# Patient Record
Sex: MALE | Race: WHITE | NOT HISPANIC OR LATINO | URBAN - METROPOLITAN AREA
[De-identification: names, ages, dates, MRNs, and addresses within clinical notes are randomized per-mention and may not be internally consistent; named-entity substitution may affect disease eponyms.]

---

## 2023-05-06 DIAGNOSIS — E10.9 TYPE 1 DIABETES MELLITUS WITHOUT COMPLICATION (MULTI): Primary | ICD-10-CM

## 2023-05-08 RX ORDER — INSULIN GLARGINE 100 [IU]/ML
INJECTION, SOLUTION SUBCUTANEOUS
Qty: 20 ML | Refills: 3 | Status: SHIPPED | OUTPATIENT
Start: 2023-05-08 | End: 2023-12-18 | Stop reason: SDUPTHER

## 2023-06-01 ENCOUNTER — OFFICE VISIT (OUTPATIENT)
Dept: PRIMARY CARE | Facility: CLINIC | Age: 38
End: 2023-06-01
Payer: COMMERCIAL

## 2023-06-01 VITALS
TEMPERATURE: 98.1 F | HEART RATE: 87 BPM | WEIGHT: 165 LBS | DIASTOLIC BLOOD PRESSURE: 86 MMHG | BODY MASS INDEX: 23.01 KG/M2 | SYSTOLIC BLOOD PRESSURE: 126 MMHG | OXYGEN SATURATION: 96 %

## 2023-06-01 DIAGNOSIS — J32.9 SINUSITIS, UNSPECIFIED CHRONICITY, UNSPECIFIED LOCATION: Primary | ICD-10-CM

## 2023-06-01 DIAGNOSIS — E10.9 TYPE 1 DIABETES MELLITUS WITHOUT COMPLICATION (MULTI): ICD-10-CM

## 2023-06-01 DIAGNOSIS — T78.40XA ALLERGY, INITIAL ENCOUNTER: ICD-10-CM

## 2023-06-01 PROBLEM — K21.9 GERD WITHOUT ESOPHAGITIS: Status: ACTIVE | Noted: 2023-06-01

## 2023-06-01 PROBLEM — J30.1 ALLERGIC RHINITIS DUE TO POLLEN: Status: ACTIVE | Noted: 2023-06-01

## 2023-06-01 PROBLEM — M25.562 ACUTE PAIN OF BOTH KNEES: Status: ACTIVE | Noted: 2023-06-01

## 2023-06-01 PROBLEM — M25.561 ACUTE PAIN OF BOTH KNEES: Status: ACTIVE | Noted: 2023-06-01

## 2023-06-01 PROBLEM — J45.990 EXERCISE-INDUCED ASTHMA (HHS-HCC): Status: ACTIVE | Noted: 2023-06-01

## 2023-06-01 PROCEDURE — 3074F SYST BP LT 130 MM HG: CPT | Performed by: NURSE PRACTITIONER

## 2023-06-01 PROCEDURE — 3079F DIAST BP 80-89 MM HG: CPT | Performed by: NURSE PRACTITIONER

## 2023-06-01 PROCEDURE — 99214 OFFICE O/P EST MOD 30 MIN: CPT | Performed by: NURSE PRACTITIONER

## 2023-06-01 RX ORDER — FLUTICASONE PROPIONATE 50 MCG
2 SPRAY, SUSPENSION (ML) NASAL DAILY
Qty: 16 G | Refills: 11 | Status: SHIPPED | OUTPATIENT
Start: 2023-06-01 | End: 2023-06-15 | Stop reason: SDUPTHER

## 2023-06-01 RX ORDER — MOMETASONE FUROATE AND FORMOTEROL FUMARATE DIHYDRATE 200; 5 UG/1; UG/1
2 AEROSOL RESPIRATORY (INHALATION) 2 TIMES DAILY
COMMUNITY
Start: 2016-01-20 | End: 2023-08-18 | Stop reason: ALTCHOICE

## 2023-06-01 RX ORDER — INSULIN ASPART 100 [IU]/ML
INJECTION, SOLUTION INTRAVENOUS; SUBCUTANEOUS
COMMUNITY
Start: 2014-07-16 | End: 2023-08-02 | Stop reason: SDUPTHER

## 2023-06-01 RX ORDER — ALBUTEROL SULFATE 90 UG/1
AEROSOL, METERED RESPIRATORY (INHALATION)
COMMUNITY
Start: 2014-07-16

## 2023-06-01 RX ORDER — MONTELUKAST SODIUM 10 MG/1
10 TABLET ORAL NIGHTLY
Qty: 30 TABLET | Refills: 1 | Status: SHIPPED | OUTPATIENT
Start: 2023-06-01 | End: 2023-06-15 | Stop reason: SDUPTHER

## 2023-06-01 RX ORDER — FLUTICASONE PROPIONATE 50 MCG
2 SPRAY, SUSPENSION (ML) NASAL DAILY
COMMUNITY
Start: 2016-05-25 | End: 2023-06-01 | Stop reason: SDUPTHER

## 2023-06-01 RX ORDER — AMOXICILLIN 875 MG/1
875 TABLET, FILM COATED ORAL 2 TIMES DAILY
Qty: 20 TABLET | Refills: 0 | Status: SHIPPED | OUTPATIENT
Start: 2023-06-01 | End: 2023-06-15 | Stop reason: ALTCHOICE

## 2023-06-01 ASSESSMENT — PATIENT HEALTH QUESTIONNAIRE - PHQ9
1. LITTLE INTEREST OR PLEASURE IN DOING THINGS: NOT AT ALL
2. FEELING DOWN, DEPRESSED OR HOPELESS: NOT AT ALL
SUM OF ALL RESPONSES TO PHQ9 QUESTIONS 1 AND 2: 0

## 2023-06-01 NOTE — PROGRESS NOTES
Subjective   Patient ID: Jaylan Reyna is a 37 y.o. male who presents for URI (Yellow nasal drainage x 5 days, has been using OTC products without success).    HPI     Allergies started about 2 months ago  Has tried everything out there  All OTC antihistamines    Nothing seems to help.  About 5 days ago has started with yellow mucous.  Pressure between eyes.  9 boxes of tissues.  Popping in ears.  Lots of pressure    Review of Systems   All other systems reviewed and are negative.  Sugar has been OK most of the time.      Objective   There were no vitals taken for this visit.    Physical Exam  Vitals and nursing note reviewed.   Constitutional:       Appearance: Normal appearance.   HENT:      Head: Normocephalic and atraumatic.      Jaw: Tenderness present.      Comments: Maxillary tenderness     Right Ear: Ear canal and external ear normal.      Left Ear: Ear canal and external ear normal.      Ears:      Comments: Fluid/air line JENNIFER     Nose: Congestion present.      Mouth/Throat:      Mouth: Mucous membranes are moist.      Comments: + PND  Cardiovascular:      Rate and Rhythm: Normal rate and regular rhythm.      Heart sounds: Normal heart sounds.   Pulmonary:      Effort: Pulmonary effort is normal.      Breath sounds: Normal breath sounds.   Musculoskeletal:      Cervical back: Normal range of motion and neck supple.   Skin:     General: Skin is warm.   Neurological:      General: No focal deficit present.      Mental Status: He is alert and oriented to person, place, and time.   Psychiatric:         Mood and Affect: Mood normal.         Behavior: Behavior normal.         Assessment/Plan   Problem List Items Addressed This Visit       Type 1 diabetes mellitus (CMS/[facility])     Stable per pt         Sinusitis - Primary    Allergies    Relevant Medications    fluticasone (Flonase) 50 mcg/actuation nasal spray    montelukast (Singulair) 10 mg tablet

## 2023-06-01 NOTE — PATIENT INSTRUCTIONS
For the sinuses, use Amoxil one pill twice daily   Fluticasone (2 sprays each nostril) once daily after showering  For the allergies, add Singulair (Montelukast) one pill in the evening.  You can take with all the Rx that you are using now.  Let me know if this works and I can refill.

## 2023-06-15 RX ORDER — MONTELUKAST SODIUM 10 MG/1
10 TABLET ORAL NIGHTLY
Qty: 90 TABLET | Refills: 3 | Status: SHIPPED | OUTPATIENT
Start: 2023-06-15 | End: 2023-08-02 | Stop reason: SDUPTHER

## 2023-06-15 RX ORDER — DOXYCYCLINE 100 MG/1
100 CAPSULE ORAL 2 TIMES DAILY
Qty: 14 CAPSULE | Refills: 0 | Status: SHIPPED | OUTPATIENT
Start: 2023-06-15 | End: 2023-06-22

## 2023-06-15 RX ORDER — FLUTICASONE PROPIONATE 50 MCG
2 SPRAY, SUSPENSION (ML) NASAL DAILY
Qty: 48 G | Refills: 3 | Status: SHIPPED | OUTPATIENT
Start: 2023-06-15

## 2023-07-01 PROBLEM — T78.40XA ALLERGIES: Status: ACTIVE | Noted: 2023-07-01

## 2023-07-01 PROBLEM — J32.9 SINUSITIS: Status: ACTIVE | Noted: 2023-07-01

## 2023-08-02 DIAGNOSIS — E10.9 TYPE 1 DIABETES MELLITUS WITHOUT COMPLICATION (MULTI): ICD-10-CM

## 2023-08-02 DIAGNOSIS — T78.40XA ALLERGY, INITIAL ENCOUNTER: ICD-10-CM

## 2023-08-02 RX ORDER — MONTELUKAST SODIUM 10 MG/1
10 TABLET ORAL NIGHTLY
Qty: 90 TABLET | Refills: 1 | Status: SHIPPED | OUTPATIENT
Start: 2023-08-02 | End: 2024-03-12 | Stop reason: SDUPTHER

## 2023-08-02 RX ORDER — INSULIN LISPRO 100 [IU]/ML
6 INJECTION, SOLUTION INTRAVENOUS; SUBCUTANEOUS
Qty: 5.4 ML | Refills: 5 | Status: SHIPPED | OUTPATIENT
Start: 2023-08-02 | End: 2024-03-12

## 2023-08-18 ENCOUNTER — OFFICE VISIT (OUTPATIENT)
Dept: PRIMARY CARE | Facility: CLINIC | Age: 38
End: 2023-08-18
Payer: COMMERCIAL

## 2023-08-18 VITALS
DIASTOLIC BLOOD PRESSURE: 70 MMHG | TEMPERATURE: 97.2 F | BODY MASS INDEX: 22.43 KG/M2 | WEIGHT: 160.8 LBS | SYSTOLIC BLOOD PRESSURE: 130 MMHG

## 2023-08-18 DIAGNOSIS — E10.9 TYPE 1 DIABETES MELLITUS WITHOUT COMPLICATION (MULTI): Primary | ICD-10-CM

## 2023-08-18 PROCEDURE — 3075F SYST BP GE 130 - 139MM HG: CPT | Performed by: NURSE PRACTITIONER

## 2023-08-18 PROCEDURE — 99213 OFFICE O/P EST LOW 20 MIN: CPT | Performed by: NURSE PRACTITIONER

## 2023-08-18 PROCEDURE — 84156 ASSAY OF PROTEIN URINE: CPT

## 2023-08-18 PROCEDURE — 3078F DIAST BP <80 MM HG: CPT | Performed by: NURSE PRACTITIONER

## 2023-08-18 PROCEDURE — 82570 ASSAY OF URINE CREATININE: CPT

## 2023-08-18 ASSESSMENT — ENCOUNTER SYMPTOMS
HEADACHES: 1
PALPITATIONS: 0
NAUSEA: 0
DIZZINESS: 0
WEAKNESS: 0
FEVER: 0
CHEST TIGHTNESS: 0
ABDOMINAL PAIN: 0
DIARRHEA: 0
SHORTNESS OF BREATH: 0
NUMBNESS: 1
FATIGUE: 0
VOMITING: 0
CHILLS: 0
COUGH: 0

## 2023-08-18 NOTE — PATIENT INSTRUCTIONS
Obtain the fasting labs as ordered today.  Be sure to get updated eye exams.  Referral to endocrinology.  Prescriptions provided for a new glucometer.

## 2023-08-18 NOTE — PROGRESS NOTES
Subjective   Patient ID: Jaylan Reyna is a 38 y.o. male who presents for Follow-up (Blood sugars).    HPI  He presents to the office today for a follow up on DM.  Diagnosed with DM at age 8 or 9.   Brother is also diabetic  Had a lot of issues with low blood sugars when first diagnosed as a child.  Very few episodes now of hypoglycemia.  Gets irritable and shaky when low. (+) foggy feeling.  If blood sugar is high- frequent urination and thirst.  Less patient with people when running high.    Checks blood sugars sporadically and they generally run 140-165. He reports his test strips have not been covered by insurance.  Glucometer is about 4 years old.  Takes Lantus 60-65 units every night.  Humalog 6-9 units with breakfast                 4-6 units with lunch                  6-9 units with dinner.    He is interested in getting an insulin pump.  He has not had an A1C in several years.  Last labs 2021.  Has not seen an endocrinologist for several years.  He does not need medications refilled today.  He reports he does have numbness in his feet  and has for years- comes and goes. (+) burning sensation.    Asthma is well controlled. Only uses the Albuterol in the fall and winter or when allergies flare up.    Review of Systems   Constitutional:  Negative for chills, fatigue and fever.   Eyes:  Negative for visual disturbance.   Respiratory:  Negative for cough, chest tightness and shortness of breath.    Cardiovascular:  Negative for chest pain, palpitations and leg swelling.   Gastrointestinal:  Negative for abdominal pain, diarrhea, nausea and vomiting.   Neurological:  Positive for numbness and headaches. Negative for dizziness and weakness.     Objective   /70 (BP Location: Right arm, Patient Position: Sitting)   Temp 36.2 °C (97.2 °F) (Temporal)   Wt 72.9 kg (160 lb 12.8 oz)   BMI 22.43 kg/m²     Physical Exam  Constitutional:       General: He is not in acute distress.     Appearance: Normal appearance.  He is not toxic-appearing.   Eyes:      Extraocular Movements: Extraocular movements intact.      Conjunctiva/sclera: Conjunctivae normal.      Pupils: Pupils are equal, round, and reactive to light.   Neck:      Vascular: No carotid bruit.   Cardiovascular:      Rate and Rhythm: Normal rate and regular rhythm.      Pulses: Normal pulses.      Heart sounds: Normal heart sounds, S1 normal and S2 normal. No murmur heard.  Pulmonary:      Effort: Pulmonary effort is normal. No respiratory distress.      Breath sounds: Normal breath sounds.   Abdominal:      General: Abdomen is flat. Bowel sounds are normal.      Palpations: Abdomen is soft.      Tenderness: There is no abdominal tenderness.   Musculoskeletal:      Cervical back: Normal range of motion.      Right lower leg: No edema.      Left lower leg: No edema.   Feet:      Right foot:      Skin integrity: Skin integrity normal.      Left foot:      Skin integrity: Skin integrity normal.   Lymphadenopathy:      Cervical: No cervical adenopathy.   Neurological:      Mental Status: He is alert and oriented to person, place, and time.   Psychiatric:         Attention and Perception: Attention normal.         Mood and Affect: Mood and affect normal.         Behavior: Behavior normal. Behavior is cooperative.         Thought Content: Thought content normal.         Cognition and Memory: Cognition normal.         Judgment: Judgment normal.         Assessment/Plan   Problem List Items Addressed This Visit       Type 1 diabetes mellitus (CMS/[facility]) - Primary     Obtain the fasting labs as ordered today.  Be sure to get updated eye exams.  Referral to endocrinology.  Prescriptions provided for a new glucometer.         Relevant Orders    Comprehensive Metabolic Panel    Hemoglobin A1C    Lipid Panel    CBC    Protein, Urine Random    Referral to Endocrinology            It has been a pleasure seeing you today!

## 2023-08-19 LAB
CREATININE (MG/DL) IN URINE: 135 MG/DL (ref 20–370)
PROTEIN (MG/DL) IN URINE: 287 MG/DL (ref 5–25)
PROTEIN/CREATININE (MG/MG) IN URINE: 2.13 MG/MG CREAT (ref 0–0.17)

## 2023-08-23 ENCOUNTER — TELEPHONE (OUTPATIENT)
Dept: PRIMARY CARE | Facility: CLINIC | Age: 38
End: 2023-08-23
Payer: COMMERCIAL

## 2023-08-23 NOTE — TELEPHONE ENCOUNTER
Per HIPAA left detailed message about lab and  blood work to be done asap. Asked him to call the office and let us know if he got an appointment with endocrinology and if he wants her to sent in dexcom.    Awaiting response  ]

## 2023-08-25 ENCOUNTER — TELEPHONE (OUTPATIENT)
Dept: PRIMARY CARE | Facility: CLINIC | Age: 38
End: 2023-08-25
Payer: COMMERCIAL

## 2023-08-25 NOTE — TELEPHONE ENCOUNTER
Sent WebTV message to let him know results, that there is blood work and if he got in with endocrinology.

## 2023-12-18 DIAGNOSIS — E10.9 TYPE 1 DIABETES MELLITUS WITHOUT COMPLICATION (MULTI): ICD-10-CM

## 2023-12-18 RX ORDER — INSULIN GLARGINE 100 [IU]/ML
INJECTION, SOLUTION SUBCUTANEOUS
Qty: 20 ML | Refills: 0 | OUTPATIENT
Start: 2023-12-18

## 2023-12-18 RX ORDER — INSULIN GLARGINE 100 [IU]/ML
INJECTION, SOLUTION SUBCUTANEOUS
Qty: 20 ML | Refills: 0 | Status: SHIPPED | OUTPATIENT
Start: 2023-12-18 | End: 2024-03-06

## 2023-12-18 NOTE — TELEPHONE ENCOUNTER
He said he has put all of that off because he is currently in a custody jara for his daughter. He said once that is done, he was going to make an appointment for endocrinology. He stated he still needs this refilled.   [[email or web address]],[[email or web address]_OB_FT:[email or web address]]

## 2023-12-19 NOTE — TELEPHONE ENCOUNTER
Pt is aware of insulin being refilled for a short supply. He will try to get blood work done this week. Told pt he needs to be fasting for 12 hours. He verbalized understanding and had no further questions

## 2024-03-04 ENCOUNTER — LAB (OUTPATIENT)
Dept: LAB | Facility: LAB | Age: 39
End: 2024-03-04
Payer: COMMERCIAL

## 2024-03-04 DIAGNOSIS — E10.9 TYPE 1 DIABETES MELLITUS WITHOUT COMPLICATION (MULTI): ICD-10-CM

## 2024-03-04 LAB
ALBUMIN SERPL BCP-MCNC: 4.1 G/DL (ref 3.4–5)
ALP SERPL-CCNC: 60 U/L (ref 33–120)
ALT SERPL W P-5'-P-CCNC: 22 U/L (ref 10–52)
ANION GAP SERPL CALC-SCNC: 9 MMOL/L (ref 10–20)
AST SERPL W P-5'-P-CCNC: 18 U/L (ref 9–39)
BILIRUB SERPL-MCNC: 2.7 MG/DL (ref 0–1.2)
BUN SERPL-MCNC: 24 MG/DL (ref 6–23)
CALCIUM SERPL-MCNC: 9.8 MG/DL (ref 8.6–10.3)
CHLORIDE SERPL-SCNC: 106 MMOL/L (ref 98–107)
CHOLEST SERPL-MCNC: 268 MG/DL (ref 0–199)
CHOLESTEROL/HDL RATIO: 3.8
CO2 SERPL-SCNC: 31 MMOL/L (ref 21–32)
CREAT SERPL-MCNC: 1.17 MG/DL (ref 0.5–1.3)
EGFRCR SERPLBLD CKD-EPI 2021: 83 ML/MIN/1.73M*2
ERYTHROCYTE [DISTWIDTH] IN BLOOD BY AUTOMATED COUNT: 12.4 % (ref 11.5–14.5)
EST. AVERAGE GLUCOSE BLD GHB EST-MCNC: 226 MG/DL
GLUCOSE SERPL-MCNC: 63 MG/DL (ref 74–99)
HBA1C MFR BLD: 9.5 %
HCT VFR BLD AUTO: 50.7 % (ref 41–52)
HDLC SERPL-MCNC: 71.1 MG/DL
HGB BLD-MCNC: 16.6 G/DL (ref 13.5–17.5)
LDLC SERPL CALC-MCNC: 171 MG/DL
MCH RBC QN AUTO: 30.4 PG (ref 26–34)
MCHC RBC AUTO-ENTMCNC: 32.7 G/DL (ref 32–36)
MCV RBC AUTO: 93 FL (ref 80–100)
NON HDL CHOLESTEROL: 197 MG/DL (ref 0–149)
NRBC BLD-RTO: 0 /100 WBCS (ref 0–0)
PLATELET # BLD AUTO: 317 X10*3/UL (ref 150–450)
POTASSIUM SERPL-SCNC: 4.2 MMOL/L (ref 3.5–5.3)
PROT SERPL-MCNC: 7 G/DL (ref 6.4–8.2)
RBC # BLD AUTO: 5.46 X10*6/UL (ref 4.5–5.9)
SODIUM SERPL-SCNC: 142 MMOL/L (ref 136–145)
TRIGL SERPL-MCNC: 128 MG/DL (ref 0–149)
VLDL: 26 MG/DL (ref 0–40)
WBC # BLD AUTO: 7.2 X10*3/UL (ref 4.4–11.3)

## 2024-03-04 PROCEDURE — 85027 COMPLETE CBC AUTOMATED: CPT

## 2024-03-04 PROCEDURE — 80061 LIPID PANEL: CPT

## 2024-03-04 PROCEDURE — 83036 HEMOGLOBIN GLYCOSYLATED A1C: CPT

## 2024-03-04 PROCEDURE — 36415 COLL VENOUS BLD VENIPUNCTURE: CPT

## 2024-03-04 PROCEDURE — 80053 COMPREHEN METABOLIC PANEL: CPT

## 2024-03-12 ENCOUNTER — OFFICE VISIT (OUTPATIENT)
Dept: PRIMARY CARE | Facility: CLINIC | Age: 39
End: 2024-03-12
Payer: COMMERCIAL

## 2024-03-12 VITALS
HEART RATE: 79 BPM | SYSTOLIC BLOOD PRESSURE: 150 MMHG | DIASTOLIC BLOOD PRESSURE: 90 MMHG | OXYGEN SATURATION: 98 % | WEIGHT: 164.6 LBS | HEIGHT: 70 IN | TEMPERATURE: 97.4 F | BODY MASS INDEX: 23.56 KG/M2

## 2024-03-12 DIAGNOSIS — R17 ELEVATED BILIRUBIN: ICD-10-CM

## 2024-03-12 DIAGNOSIS — E10.9 TYPE 1 DIABETES MELLITUS WITHOUT COMPLICATION (MULTI): Primary | ICD-10-CM

## 2024-03-12 DIAGNOSIS — R80.9 PROTEINURIA, UNSPECIFIED TYPE: ICD-10-CM

## 2024-03-12 DIAGNOSIS — E78.5 HYPERLIPIDEMIA, UNSPECIFIED HYPERLIPIDEMIA TYPE: ICD-10-CM

## 2024-03-12 DIAGNOSIS — T78.40XA ALLERGY, INITIAL ENCOUNTER: ICD-10-CM

## 2024-03-12 DIAGNOSIS — J45.990 EXERCISE-INDUCED ASTHMA (HHS-HCC): ICD-10-CM

## 2024-03-12 PROCEDURE — 99214 OFFICE O/P EST MOD 30 MIN: CPT | Performed by: NURSE PRACTITIONER

## 2024-03-12 PROCEDURE — 3077F SYST BP >= 140 MM HG: CPT | Performed by: NURSE PRACTITIONER

## 2024-03-12 PROCEDURE — 3080F DIAST BP >= 90 MM HG: CPT | Performed by: NURSE PRACTITIONER

## 2024-03-12 PROCEDURE — 3050F LDL-C >= 130 MG/DL: CPT | Performed by: NURSE PRACTITIONER

## 2024-03-12 PROCEDURE — 3046F HEMOGLOBIN A1C LEVEL >9.0%: CPT | Performed by: NURSE PRACTITIONER

## 2024-03-12 RX ORDER — MONTELUKAST SODIUM 10 MG/1
10 TABLET ORAL NIGHTLY
Qty: 90 TABLET | Refills: 1 | Status: SHIPPED | OUTPATIENT
Start: 2024-03-12 | End: 2024-09-08

## 2024-03-12 ASSESSMENT — ENCOUNTER SYMPTOMS
NAUSEA: 0
VOMITING: 0
COUGH: 0
NUMBNESS: 0
ABDOMINAL PAIN: 0
CHEST TIGHTNESS: 0
DIZZINESS: 0
PALPITATIONS: 0
FEVER: 0
WEAKNESS: 0
FATIGUE: 0
CHILLS: 0
HEADACHES: 0
DIARRHEA: 0
SHORTNESS OF BREATH: 0

## 2024-03-12 ASSESSMENT — PATIENT HEALTH QUESTIONNAIRE - PHQ9
1. LITTLE INTEREST OR PLEASURE IN DOING THINGS: NOT AT ALL
SUM OF ALL RESPONSES TO PHQ9 QUESTIONS 1 AND 2: 0
2. FEELING DOWN, DEPRESSED OR HOPELESS: NOT AT ALL

## 2024-03-12 NOTE — PROGRESS NOTES
Subjective   Jaylan Reyna is a 38 y.o. male who presents for Results (Review labs in more detail), Referral (Endocrinologist for [facility]), and Med Refill (Singulair ).    Med Refill  Pertinent negatives include no abdominal pain, chest pain, chills, coughing, fatigue, fever, headaches, nausea, numbness, vomiting or weakness.     He presents to the office today for medication refills and follow up/review of labs.   He needs another referral to endocrinology today. He was referred at last visit in August but reports he did not schedule.  He reports he is feeling great.  He is tolerating medication well at current dose- no side effects. No chest pain, shortness of breath, palpitations or edema. No headaches, numbness, tingling, weakness or vision changes.  No dizziness.  Last eye exam: Goes annually.  Diet: Reports he eats 6000 calories a day/ Diet is not healthy- eating a lot of fatty foods. Eats green and then cooks his vegetables in the grease.  Exercise: Very active for job but no scheduled exercise. Does walk a lot for work       Checking blood sugars once every couple of days. Checks if concerned it is too high or too low.   Does not have hypoglycemia very often. The lowest he ususally gets when symptomatic is 60.  No excessive urination.  He is interested in getting CGM and insulin pump for better control.    He also needs a refill on the Singhulair for his asthma. Overall is well controlled.       Last labs:   Lab Results   Component Value Date    GLUCOSE 63 (L) 03/04/2024    CALCIUM 9.8 03/04/2024     03/04/2024    K 4.2 03/04/2024    CO2 31 03/04/2024     03/04/2024    BUN 24 (H) 03/04/2024    CREATININE 1.17 03/04/2024      Lab Results   Component Value Date    WBC 7.2 03/04/2024    HGB 16.6 03/04/2024    HCT 50.7 03/04/2024    MCV 93 03/04/2024     03/04/2024      Lab Results   Component Value Date    CHOL 268 (H) 03/04/2024     Lab Results   Component Value Date    HDL 71.1  "03/04/2024     Lab Results   Component Value Date    LDLCALC 171 (H) 03/04/2024     Lab Results   Component Value Date    TRIG 128 03/04/2024     No components found for: \"CHOLHDL\"   Lab Results   Component Value Date    HGBA1C 9.5 (H) 03/04/2024        Review of Systems   Constitutional:  Negative for chills, fatigue and fever.   Eyes:  Negative for visual disturbance.   Respiratory:  Negative for cough, chest tightness and shortness of breath.    Cardiovascular:  Negative for chest pain, palpitations and leg swelling.   Gastrointestinal:  Negative for abdominal pain, diarrhea, nausea and vomiting.   Neurological:  Negative for dizziness, weakness, numbness and headaches.     Objective   /90 (BP Location: Left arm, Patient Position: Sitting)   Pulse 79   Temp 36.3 °C (97.4 °F) (Temporal)   Ht 1.787 m (5' 10.35\")   Wt 74.7 kg (164 lb 9.6 oz)   SpO2 98%   BMI 23.38 kg/m²     Physical Exam  Constitutional:       General: He is not in acute distress.     Appearance: Normal appearance. He is not toxic-appearing.   Eyes:      Extraocular Movements: Extraocular movements intact.      Conjunctiva/sclera: Conjunctivae normal.      Pupils: Pupils are equal, round, and reactive to light.   Neck:      Vascular: No carotid bruit.   Cardiovascular:      Rate and Rhythm: Normal rate and regular rhythm.      Pulses: Normal pulses.      Heart sounds: Normal heart sounds, S1 normal and S2 normal. No murmur heard.  Pulmonary:      Effort: Pulmonary effort is normal. No respiratory distress.      Breath sounds: Normal breath sounds.   Abdominal:      General: Bowel sounds are normal.      Palpations: Abdomen is soft.      Tenderness: There is no abdominal tenderness.   Musculoskeletal:      Right lower leg: No edema.      Left lower leg: No edema.   Lymphadenopathy:      Cervical: No cervical adenopathy.   Neurological:      Mental Status: He is alert and oriented to person, place, and time.   Psychiatric:         " Attention and Perception: Attention normal.         Mood and Affect: Mood and affect normal.         Behavior: Behavior normal. Behavior is cooperative.         Thought Content: Thought content normal.         Cognition and Memory: Cognition normal.         Judgment: Judgment normal.         Assessment/Plan   Problem List Items Addressed This Visit       Exercise-induced asthma     Stable on Singulair.         Type 1 diabetes mellitus (CMS/HCC) - Primary     Referral to endocrinology as he is interested in getting an insulin pump.         Relevant Orders    Referral to Endocrinology    Albumin, urine, random    Allergies     Stable on current medications.         Relevant Medications    montelukast (Singulair) 10 mg tablet    Hyperlipidemia     Would recommend focusing on healthier food choices and starting a statin.   Will recheck hepatic function prior to starting.          Proteinuria     Recheck urine albumin. Start Lisinopril 10 mg daily.         Relevant Orders    Albumin, urine, random     Other Visit Diagnoses       Elevated bilirubin        Relevant Orders    Hepatic function panel            It has been a pleasure seeing you today!

## 2024-03-13 NOTE — ASSESSMENT & PLAN NOTE
Would recommend focusing on healthier food choices and starting a statin.   Will recheck hepatic function prior to starting.

## 2024-03-18 DIAGNOSIS — R80.9 PROTEINURIA, UNSPECIFIED TYPE: Primary | ICD-10-CM

## 2024-03-18 RX ORDER — LISINOPRIL 5 MG/1
5 TABLET ORAL DAILY
Qty: 100 TABLET | Refills: 0 | Status: SHIPPED | OUTPATIENT
Start: 2024-03-18 | End: 2025-04-22

## 2024-07-30 ENCOUNTER — OFFICE VISIT (OUTPATIENT)
Dept: PRIMARY CARE | Facility: CLINIC | Age: 39
End: 2024-07-30
Payer: COMMERCIAL

## 2024-07-30 VITALS
BODY MASS INDEX: 24.74 KG/M2 | HEART RATE: 80 BPM | SYSTOLIC BLOOD PRESSURE: 142 MMHG | TEMPERATURE: 98.2 F | DIASTOLIC BLOOD PRESSURE: 90 MMHG | WEIGHT: 174.2 LBS | OXYGEN SATURATION: 97 %

## 2024-07-30 DIAGNOSIS — R80.9 PROTEINURIA, UNSPECIFIED TYPE: ICD-10-CM

## 2024-07-30 DIAGNOSIS — J01.90 ACUTE NON-RECURRENT SINUSITIS, UNSPECIFIED LOCATION: Primary | ICD-10-CM

## 2024-07-30 DIAGNOSIS — I10 HYPERTENSION, UNSPECIFIED TYPE: ICD-10-CM

## 2024-07-30 DIAGNOSIS — T78.40XA ALLERGY, INITIAL ENCOUNTER: ICD-10-CM

## 2024-07-30 PROCEDURE — 3050F LDL-C >= 130 MG/DL: CPT | Performed by: NURSE PRACTITIONER

## 2024-07-30 PROCEDURE — 3046F HEMOGLOBIN A1C LEVEL >9.0%: CPT | Performed by: NURSE PRACTITIONER

## 2024-07-30 PROCEDURE — 3080F DIAST BP >= 90 MM HG: CPT | Performed by: NURSE PRACTITIONER

## 2024-07-30 PROCEDURE — 4010F ACE/ARB THERAPY RXD/TAKEN: CPT | Performed by: NURSE PRACTITIONER

## 2024-07-30 PROCEDURE — 3077F SYST BP >= 140 MM HG: CPT | Performed by: NURSE PRACTITIONER

## 2024-07-30 PROCEDURE — 99214 OFFICE O/P EST MOD 30 MIN: CPT | Performed by: NURSE PRACTITIONER

## 2024-07-30 RX ORDER — OXYCODONE AND ACETAMINOPHEN 5; 325 MG/1; MG/1
1 TABLET ORAL EVERY 6 HOURS
COMMUNITY
Start: 2024-02-06 | End: 2024-07-30 | Stop reason: ALTCHOICE

## 2024-07-30 RX ORDER — MONTELUKAST SODIUM 10 MG/1
10 TABLET ORAL NIGHTLY
Qty: 90 TABLET | Refills: 1 | Status: SHIPPED | OUTPATIENT
Start: 2024-07-30 | End: 2025-01-26

## 2024-07-30 RX ORDER — LISINOPRIL 10 MG/1
10 TABLET ORAL DAILY
Qty: 90 TABLET | Refills: 1 | Status: SHIPPED | OUTPATIENT
Start: 2024-07-30

## 2024-07-30 RX ORDER — AMOXICILLIN AND CLAVULANATE POTASSIUM 875; 125 MG/1; MG/1
875 TABLET, FILM COATED ORAL 2 TIMES DAILY
Qty: 14 TABLET | Refills: 0 | Status: SHIPPED | OUTPATIENT
Start: 2024-07-30

## 2024-07-30 ASSESSMENT — PATIENT HEALTH QUESTIONNAIRE - PHQ9
2. FEELING DOWN, DEPRESSED OR HOPELESS: NOT AT ALL
1. LITTLE INTEREST OR PLEASURE IN DOING THINGS: NOT AT ALL
SUM OF ALL RESPONSES TO PHQ9 QUESTIONS 1 AND 2: 0

## 2024-07-30 ASSESSMENT — ENCOUNTER SYMPTOMS
FACIAL SWELLING: 0
SHORTNESS OF BREATH: 0
HEADACHES: 1
MYALGIAS: 0
NUMBNESS: 1
SINUS PRESSURE: 0
FATIGUE: 1
COUGH: 1
RHINORRHEA: 1
SORE THROAT: 0
FEVER: 0
WHEEZING: 0
CHILLS: 1
SINUS PAIN: 0

## 2024-07-30 NOTE — PATIENT INSTRUCTIONS
Take the Augmentin as directed with food.  Continue to use Flonase.   Be sure to follow up with the dentist for further evaluation.  To the ER with increase swelling, pain, redness, numbness, fever.

## 2024-07-30 NOTE — PROGRESS NOTES
Subjective   Jaylan Reyna is a 39 y.o. male who presents for Numbness (He has had Numbness from middle of nose/face to right ear. Started 3 days ago- he was sick and went to urgent care beginning of July. Was told to take sudafed. ) and Med Refill.    Neuropathy      Med Refill  Associated symptoms include chills, congestion, coughing, fatigue, headaches and numbness. Pertinent negatives include no fever, myalgias or sore throat.     He presents to the office today for evaluation of sinus symptoms.  About a month ago developed a runny nose, nasal congestion, PND and sore throat.  (+) cough at night.  Coughing so much because he was throwing up. No SOB or wheezing.  Cough persists in the evening.  (+) pain in the right ear if he lays on it.   No fever or chills.  No night sweats.  (+) facial pressure intermittent for the past month.  Daughter sick with similar.    He went to urgent care about a month ago when it started- told to take Sudafed which he has been- no help.    Over the last couple of days has noticed tingling in the right jawline and into neck and into the ear.  No headache.  Not as much in the face/cheek.    No tooth pain.  Had a broken molar that was extracted about a month ago. Hit with a bar in the right cheek which broke off tooth.  Has a follow up dental visit next week.    Review of Systems   Constitutional:  Positive for chills and fatigue. Negative for fever.   HENT:  Positive for congestion, dental problem, ear pain, postnasal drip and rhinorrhea. Negative for ear discharge, facial swelling, sinus pressure, sinus pain and sore throat.    Respiratory:  Positive for cough. Negative for shortness of breath and wheezing.    Musculoskeletal:  Negative for myalgias.   Neurological:  Positive for numbness and headaches.     Objective   /90 (BP Location: Left arm, Patient Position: Sitting)   Pulse 80   Temp 36.8 °C (98.2 °F) (Oral)   Wt 79 kg (174 lb 3.2 oz)   SpO2 97%   BMI 24.74 kg/m²      Physical Exam  Constitutional:       General: He is not in acute distress.     Appearance: Normal appearance. He is not toxic-appearing.   HENT:      Head:      Comments: Decreased sensation noted along the right mandible.     Right Ear: Hearing, tympanic membrane, ear canal and external ear normal.      Left Ear: Hearing, tympanic membrane, ear canal and external ear normal.      Ears:      Comments: No erythema, edema or significant pain noted to ear.     Nose: Nose normal.      Right Sinus: No maxillary sinus tenderness or frontal sinus tenderness.      Left Sinus: No maxillary sinus tenderness or frontal sinus tenderness.      Mouth/Throat:      Mouth: Mucous membranes are moist.      Pharynx: No pharyngeal swelling or posterior oropharyngeal erythema.      Comments: No edema noted to face  (+) palpable lump noted to right mandible.  Eyes:      Extraocular Movements: Extraocular movements intact.      Conjunctiva/sclera: Conjunctivae normal.      Pupils: Pupils are equal, round, and reactive to light.   Cardiovascular:      Rate and Rhythm: Normal rate and regular rhythm.      Heart sounds: Normal heart sounds, S1 normal and S2 normal. No murmur heard.  Pulmonary:      Effort: Pulmonary effort is normal.      Breath sounds: Normal breath sounds and air entry.   Lymphadenopathy:      Cervical: No cervical adenopathy.   Neurological:      Mental Status: He is alert and oriented to person, place, and time.      Cranial Nerves: Cranial nerves 2-12 are intact. No facial asymmetry.   Psychiatric:         Mood and Affect: Mood normal.         Behavior: Behavior normal.         Thought Content: Thought content normal.         Judgment: Judgment normal.         Assessment/Plan   Problem List Items Addressed This Visit       Sinusitis - Primary     Will treat with Augmentin given sinus symptoms.  Strongly encouraged to follow-up with dentist as suspect there is likely an underlying dental issue on the right side.          Relevant Medications    amoxicillin-pot clavulanate (Augmentin) 875-125 mg tablet    Allergies     Stable on Singulair.  Refilled today.         Relevant Medications    montelukast (Singulair) 10 mg tablet    Proteinuria     Given blood pressure remains elevated today and home readings are also elevated, will increase lisinopril to 10 mg daily.  Prescription sent to the pharmacy.         Relevant Medications    lisinopril 10 mg tablet    Hypertension    Relevant Medications    lisinopril 10 mg tablet       It has been a pleasure seeing you today!

## 2024-07-31 NOTE — ASSESSMENT & PLAN NOTE
Will treat with Augmentin given sinus symptoms.  Strongly encouraged to follow-up with dentist as suspect there is likely an underlying dental issue on the right side.

## 2024-07-31 NOTE — ASSESSMENT & PLAN NOTE
Given blood pressure remains elevated today and home readings are also elevated, will increase lisinopril to 10 mg daily.  Prescription sent to the pharmacy.

## 2024-08-14 ENCOUNTER — APPOINTMENT (OUTPATIENT)
Dept: ENDOCRINOLOGY | Facility: CLINIC | Age: 39
End: 2024-08-14
Payer: COMMERCIAL